# Patient Record
Sex: MALE | Race: WHITE | Employment: FULL TIME | URBAN - METROPOLITAN AREA
[De-identification: names, ages, dates, MRNs, and addresses within clinical notes are randomized per-mention and may not be internally consistent; named-entity substitution may affect disease eponyms.]

---

## 2017-07-06 ENCOUNTER — HOSPITAL ENCOUNTER (OUTPATIENT)
Dept: ULTRASOUND IMAGING | Age: 32
Discharge: HOME | End: 2017-07-06
Attending: INTERNAL MEDICINE
Payer: COMMERCIAL

## 2017-07-06 DIAGNOSIS — R10.31 ABDOMINAL PAIN, RIGHT LOWER QUADRANT: ICD-10-CM

## 2017-07-06 PROCEDURE — 76870 US EXAM SCROTUM: CPT | Performed by: INTERNAL MEDICINE

## 2017-07-06 PROCEDURE — 93975 VASCULAR STUDY: CPT | Performed by: INTERNAL MEDICINE

## 2019-01-09 PROBLEM — R74.8 ELEVATED LIVER ENZYMES: Status: ACTIVE | Noted: 2019-01-09

## 2019-02-22 PROBLEM — E78.00 HYPERCHOLESTEROLEMIA: Status: ACTIVE | Noted: 2019-02-22

## 2019-11-19 PROBLEM — K76.0 FATTY LIVER: Status: ACTIVE | Noted: 2019-11-19

## 2020-08-27 ENCOUNTER — ORDER TRANSCRIPTION (OUTPATIENT)
Dept: ADMINISTRATIVE | Facility: HOSPITAL | Age: 35
End: 2020-08-27

## 2020-08-27 DIAGNOSIS — Z20.822 ENCOUNTER FOR LABORATORY TESTING FOR COVID-19 VIRUS: Primary | ICD-10-CM

## 2020-08-27 DIAGNOSIS — Z01.818 OTHER SPECIFIED PRE-OPERATIVE EXAMINATION: ICD-10-CM

## 2020-08-30 ENCOUNTER — APPOINTMENT (OUTPATIENT)
Dept: LAB | Facility: HOSPITAL | Age: 35
End: 2020-08-30
Attending: INTERNAL MEDICINE
Payer: COMMERCIAL

## 2020-08-30 DIAGNOSIS — Z20.822 ENCOUNTER FOR LABORATORY TESTING FOR COVID-19 VIRUS: ICD-10-CM

## 2020-08-30 DIAGNOSIS — Z01.818 OTHER SPECIFIED PRE-OPERATIVE EXAMINATION: ICD-10-CM

## 2020-08-31 LAB — SARS-COV-2 RNA RESP QL NAA+PROBE: NOT DETECTED

## 2020-09-02 ENCOUNTER — HOSPITAL ENCOUNTER (OUTPATIENT)
Dept: NUCLEAR MEDICINE | Facility: HOSPITAL | Age: 35
Discharge: HOME | End: 2020-09-02
Attending: INTERNAL MEDICINE
Payer: COMMERCIAL

## 2020-09-02 ENCOUNTER — HOSPITAL ENCOUNTER (OUTPATIENT)
Dept: CV DIAGNOSTICS | Facility: HOSPITAL | Age: 35
Discharge: HOME | End: 2020-09-02
Attending: INTERNAL MEDICINE
Payer: COMMERCIAL

## 2020-09-02 DIAGNOSIS — R07.89 OTHER CHEST PAIN: ICD-10-CM

## 2020-09-02 PROCEDURE — 93017 CV STRESS TEST TRACING ONLY: CPT | Performed by: INTERNAL MEDICINE

## 2020-09-02 PROCEDURE — 78452 HT MUSCLE IMAGE SPECT MULT: CPT | Performed by: INTERNAL MEDICINE

## 2020-09-02 PROCEDURE — 93018 CV STRESS TEST I&R ONLY: CPT | Performed by: INTERNAL MEDICINE

## 2020-09-02 PROCEDURE — 93016 CV STRESS TEST SUPVJ ONLY: CPT | Performed by: INTERNAL MEDICINE

## 2021-07-28 NOTE — ED INITIAL ASSESSMENT (HPI)
Pt to ED with c/o \"difficulty catching his breath\" after receiving [facility] about one hour ago. No respiratory distress noted. 98% on room air. Pt denies difficulty swallowing. Airway clear. Pt skin parameters WNL.

## 2021-07-28 NOTE — ED QUICK NOTES
Patient safe to DC home per MD. Morocho Windsor to dress self. DC teaching done, instructions reviewed with patient including when and how to follow up with healthcare providers and when to seek emergency care. The patient verbalizes understanding.  Patient ambulatory

## 2021-07-28 NOTE — ED PROVIDER NOTES
Patient Seen in: [facility] Emergency Department    History   Patient presents with: Allergic Rxn Allergies      HPI    The patient presents to the ED complaining of difficulty breathing and feeling that he cannot take a deep breath.   He states t Handwashing was performed prior to and after the exam.  Stethoscope and any equipment used during my examination was cleaned with super sani-cloth germicidal wipes following the exam.     Physical Exam  Constitutional:       Appearance: Normal appearance. return precautions were discussed with the patient and/or caregiver.       Condition upon leaving the department: Stable    Disposition and Plan     Clinical Impression:  Anxiety attack  (primary encounter diagnosis)    Disposition:  Discharge    Follow-up:

## 2021-08-05 NOTE — H&P
[facility], [facility] - Gastroenterology                                                                                                  Clinic History and Physical 119/71, pulse 95, height 6' 4\" (1.93 m), weight 268 lb (121.6 kg).     General:awake, cooperative, no acute distress  HEENT: EOMI, no scleral icterus, MMM; oral pharnyx is without exudates or lesions  Neck: no lymphadenopathy; thyroid is not enlarged and w the defined types were placed in this encounter.     Meds This Visit:  Requested Prescriptions      No prescriptions requested or ordered in this encounter     Imaging & Referrals:  None     Nargis Paulino MD  [facility], [facility] - Gastroenterology  8/6/202

## 2021-08-06 ENCOUNTER — OFFICE VISIT (OUTPATIENT)
Dept: GASTROENTEROLOGY | Facility: CLINIC | Age: 36
End: 2021-08-06
Payer: COMMERCIAL

## 2021-08-06 ENCOUNTER — TELEPHONE (OUTPATIENT)
Dept: GASTROENTEROLOGY | Facility: CLINIC | Age: 36
End: 2021-08-06

## 2021-08-06 VITALS
DIASTOLIC BLOOD PRESSURE: 71 MMHG | HEART RATE: 95 BPM | HEIGHT: 76 IN | BODY MASS INDEX: 32.63 KG/M2 | WEIGHT: 268 LBS | SYSTOLIC BLOOD PRESSURE: 119 MMHG

## 2021-08-06 DIAGNOSIS — K21.9 GASTROESOPHAGEAL REFLUX DISEASE, UNSPECIFIED WHETHER ESOPHAGITIS PRESENT: Primary | ICD-10-CM

## 2021-08-06 DIAGNOSIS — R13.10 DYSPHAGIA, UNSPECIFIED TYPE: ICD-10-CM

## 2021-08-06 DIAGNOSIS — K21.9 GASTROESOPHAGEAL REFLUX DISEASE WITHOUT ESOPHAGITIS: Primary | ICD-10-CM

## 2021-08-06 DIAGNOSIS — R13.19 ESOPHAGEAL DYSPHAGIA: ICD-10-CM

## 2021-08-06 PROCEDURE — 3074F SYST BP LT 130 MM HG: CPT | Performed by: INTERNAL MEDICINE

## 2021-08-06 PROCEDURE — 3078F DIAST BP <80 MM HG: CPT | Performed by: INTERNAL MEDICINE

## 2021-08-06 PROCEDURE — 99204 OFFICE O/P NEW MOD 45 MIN: CPT | Performed by: INTERNAL MEDICINE

## 2021-08-06 PROCEDURE — 3008F BODY MASS INDEX DOCD: CPT | Performed by: INTERNAL MEDICINE

## 2021-08-06 NOTE — TELEPHONE ENCOUNTER
Scheduled for: EGD 57353 with possible Dilation  Provider Name: Dr Dang Acuna   Date:  Wed 9/08/2021  Location: [facility]  Sedation: MAC   Time: 7:30 am, (pt is aware that [facility] will call the day before to confirm arrival time)  Prep: Nothing after midnight the da

## 2021-08-06 NOTE — PATIENT INSTRUCTIONS
1.Schedule upper endoscopy (EGD) with possible dilatation with monitored anesthesia care (MAC) at the [facility] or the hospital    2. You will also need to get tested for COVID within 72 hours prior to the procedure.  You should be contacted regarding the instru

## 2021-09-03 NOTE — TELEPHONE ENCOUNTER
Peer to peer needed for the MAC per pt's plan. They approved the EGD but not the MAC.  Please call [phone] and the Order #: [id number]

## 2021-09-03 NOTE — TELEPHONE ENCOUNTER
Called and spoke with representative transferring me to reviewer    Spoke with physician reviewer     Question is for need of MAC of which we discussed risk factors and possible need for therapeutic maneuver being dilatation    Approval number   [id number]

## 2021-09-03 NOTE — TELEPHONE ENCOUNTER
Dr. Antonio Yusuf,    I contacted [facility], spoke to [facility], and confirmed that peer to peer is needed for approval of MAC for patient EGD on 9/8/21. The number to call is [phone], option #1 then option #6.  They are available between 9AM-7PM to complete peer

## 2021-09-07 ENCOUNTER — TELEPHONE (OUTPATIENT)
Dept: GASTROENTEROLOGY | Facility: CLINIC | Age: 36
End: 2021-09-07

## 2021-09-07 NOTE — TELEPHONE ENCOUNTER
Pt called to cancel 9/8/21 EGD due to lack of transportation. Pt will call back to schedule at a later date.

## 2021-09-09 ENCOUNTER — APPOINTMENT (OUTPATIENT)
Dept: URBAN - METROPOLITAN AREA CLINIC 321 | Age: 36
Setting detail: DERMATOLOGY
End: 2021-09-09

## 2021-09-09 DIAGNOSIS — L42 PITYRIASIS ROSEA: ICD-10-CM

## 2021-09-09 PROBLEM — L30.9 DERMATITIS, UNSPECIFIED: Status: ACTIVE | Noted: 2021-09-09

## 2021-09-09 PROCEDURE — 99204 OFFICE O/P NEW MOD 45 MIN: CPT

## 2021-09-09 PROCEDURE — OTHER PRESCRIPTION: OTHER

## 2021-09-09 PROCEDURE — OTHER COUNSELING: OTHER

## 2021-09-09 RX ORDER — TRIAMCINOLONE ACETONIDE 1 MG/G
CREAM TOPICAL BID
Qty: 80 | Refills: 0 | Status: ERX

## 2021-09-09 ASSESSMENT — LOCATION SIMPLE DESCRIPTION DERM
LOCATION SIMPLE: LEFT FOREARM
LOCATION SIMPLE: RIGHT FOREARM

## 2021-09-09 ASSESSMENT — LOCATION DETAILED DESCRIPTION DERM
LOCATION DETAILED: RIGHT VENTRAL PROXIMAL FOREARM
LOCATION DETAILED: LEFT VENTRAL PROXIMAL FOREARM

## 2021-09-09 ASSESSMENT — LOCATION ZONE DERM: LOCATION ZONE: ARM

## 2021-09-09 NOTE — TELEPHONE ENCOUNTER
Cancelled for: EGD [id number] with possible Dilation  Provider Name: Dr Vanegas Situ   Date: 9/08/2021  Location: [facility]  Sedation: MAC   Time: 0730    Prep: Nothing after midnight the day before procedure and NPO 3 hrs prior procedure  Meds/Allergies Reconciled?: N

## 2022-01-27 ENCOUNTER — HOSPITAL ENCOUNTER (EMERGENCY)
Facility: HOSPITAL | Age: 37
Discharge: HOME | End: 2022-01-27
Attending: EMERGENCY MEDICINE
Payer: COMMERCIAL

## 2022-01-27 ENCOUNTER — APPOINTMENT (OUTPATIENT)
Dept: GENERAL RADIOLOGY | Facility: HOSPITAL | Age: 37
End: 2022-01-27
Attending: EMERGENCY MEDICINE
Payer: COMMERCIAL

## 2022-01-27 VITALS
WEIGHT: 268 LBS | RESPIRATION RATE: 25 BRPM | SYSTOLIC BLOOD PRESSURE: 120 MMHG | TEMPERATURE: 98 F | BODY MASS INDEX: 32.63 KG/M2 | HEART RATE: 74 BPM | DIASTOLIC BLOOD PRESSURE: 77 MMHG | OXYGEN SATURATION: 99 % | HEIGHT: 76 IN

## 2022-01-27 DIAGNOSIS — R07.9 CHEST PAIN OF UNCERTAIN ETIOLOGY: Primary | ICD-10-CM

## 2022-01-27 LAB
ANION GAP SERPL CALC-SCNC: 8 MMOL/L (ref 0–18)
BASOPHILS # BLD AUTO: 0.06 X10(3) UL (ref 0–0.2)
BASOPHILS NFR BLD AUTO: 0.5 %
BUN BLD-MCNC: 13 MG/DL (ref 7–18)
BUN/CREAT SERPL: 10.9 (ref 10–20)
CALCIUM BLD-MCNC: 9.6 MG/DL (ref 8.5–10.1)
CHLORIDE SERPL-SCNC: 103 MMOL/L (ref 98–112)
CO2 SERPL-SCNC: 26 MMOL/L (ref 21–32)
CREAT BLD-MCNC: 1.19 MG/DL
DEPRECATED RDW RBC AUTO: 42.2 FL (ref 35.1–46.3)
EOSINOPHIL # BLD AUTO: 0.1 X10(3) UL (ref 0–0.7)
EOSINOPHIL NFR BLD AUTO: 0.8 %
ERYTHROCYTE [DISTWIDTH] IN BLOOD BY AUTOMATED COUNT: 13 % (ref 11–15)
GLUCOSE BLD-MCNC: 118 MG/DL (ref 70–99)
HCT VFR BLD AUTO: 49.9 %
HGB BLD-MCNC: 16.4 G/DL
IMM GRANULOCYTES # BLD AUTO: 0.03 X10(3) UL (ref 0–1)
IMM GRANULOCYTES NFR BLD: 0.2 %
LYMPHOCYTES # BLD AUTO: 7.83 X10(3) UL (ref 1–4)
LYMPHOCYTES NFR BLD AUTO: 61.4 %
MCH RBC QN AUTO: 29 PG (ref 26–34)
MCHC RBC AUTO-ENTMCNC: 32.9 G/DL (ref 31–37)
MCV RBC AUTO: 88.2 FL
MONOCYTES # BLD AUTO: 0.92 X10(3) UL (ref 0.1–1)
MONOCYTES NFR BLD AUTO: 7.2 %
NEUTROPHILS # BLD AUTO: 3.81 X10 (3) UL (ref 1.5–7.7)
NEUTROPHILS # BLD AUTO: 3.81 X10(3) UL (ref 1.5–7.7)
NEUTROPHILS NFR BLD AUTO: 29.9 %
OSMOLALITY SERPL CALC.SUM OF ELEC: 285 MOSM/KG (ref 275–295)
PLATELET # BLD AUTO: 362 10(3)UL (ref 150–450)
POTASSIUM SERPL-SCNC: 3.3 MMOL/L (ref 3.5–5.1)
RBC # BLD AUTO: 5.66 X10(6)UL
SODIUM SERPL-SCNC: 137 MMOL/L (ref 136–145)
TROPONIN I HIGH SENSITIVITY: 4 NG/L
WBC # BLD AUTO: 12.8 X10(3) UL (ref 4–11)

## 2022-01-27 PROCEDURE — 93010 ELECTROCARDIOGRAM REPORT: CPT | Performed by: EMERGENCY MEDICINE

## 2022-01-27 PROCEDURE — 71045 X-RAY EXAM CHEST 1 VIEW: CPT | Performed by: EMERGENCY MEDICINE

## 2022-01-27 PROCEDURE — 85025 COMPLETE CBC W/AUTO DIFF WBC: CPT | Performed by: EMERGENCY MEDICINE

## 2022-01-27 PROCEDURE — 93005 ELECTROCARDIOGRAM TRACING: CPT

## 2022-01-27 PROCEDURE — 99284 EMERGENCY DEPT VISIT MOD MDM: CPT

## 2022-01-27 PROCEDURE — 84484 ASSAY OF TROPONIN QUANT: CPT | Performed by: EMERGENCY MEDICINE

## 2022-01-27 PROCEDURE — 36415 COLL VENOUS BLD VENIPUNCTURE: CPT

## 2022-01-27 PROCEDURE — 80048 BASIC METABOLIC PNL TOTAL CA: CPT | Performed by: EMERGENCY MEDICINE

## 2022-01-27 PROCEDURE — 85060 BLOOD SMEAR INTERPRETATION: CPT | Performed by: EMERGENCY MEDICINE

## 2022-01-27 NOTE — ED INITIAL ASSESSMENT (HPI)
Pt to ED from home for c/o chest pressure onset while walking 40 mins ago, states it was a 10/10 now is a 3/10. Denies/vomiting. Pt took 324mg ASA PTA. Hx of GERD, takes prilosec at home.
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
## 2022-01-27 NOTE — ED QUICK NOTES
Pt to ED via EMS with c/o left sided chest pressure that started about 40 min prior to arrival. Pt denies cough or fever. Pt states he felt short of breath when pressure started but now subsided. No respiratory distress noted. Lungs clear bilaterally.  100%

## 2022-02-12 ENCOUNTER — HOSPITAL ENCOUNTER (EMERGENCY)
Facility: HOSPITAL | Age: 37
Discharge: HOME | End: 2022-02-12
Attending: EMERGENCY MEDICINE
Payer: COMMERCIAL

## 2022-02-12 ENCOUNTER — APPOINTMENT (OUTPATIENT)
Dept: CT IMAGING | Facility: HOSPITAL | Age: 37
End: 2022-02-12
Attending: EMERGENCY MEDICINE
Payer: COMMERCIAL

## 2022-02-12 VITALS
RESPIRATION RATE: 17 BRPM | HEIGHT: 76 IN | OXYGEN SATURATION: 97 % | BODY MASS INDEX: 32.27 KG/M2 | TEMPERATURE: 99 F | HEART RATE: 99 BPM | DIASTOLIC BLOOD PRESSURE: 74 MMHG | WEIGHT: 265 LBS | SYSTOLIC BLOOD PRESSURE: 130 MMHG

## 2022-02-12 DIAGNOSIS — U07.1 COVID-19 VIRUS INFECTION: ICD-10-CM

## 2022-02-12 DIAGNOSIS — G44.201 ACUTE INTRACTABLE TENSION-TYPE HEADACHE: Primary | ICD-10-CM

## 2022-02-12 PROCEDURE — 99284 EMERGENCY DEPT VISIT MOD MDM: CPT

## 2022-02-12 PROCEDURE — 70450 CT HEAD/BRAIN W/O DYE: CPT | Performed by: EMERGENCY MEDICINE
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
## 2022-03-25 ENCOUNTER — HOSPITAL ENCOUNTER (EMERGENCY)
Facility: HOSPITAL | Age: 37
Discharge: LEFT BEFORE DISCHARGE | End: 2022-03-25
Payer: COMMERCIAL

## 2022-03-25 VITALS
HEIGHT: 76 IN | SYSTOLIC BLOOD PRESSURE: 153 MMHG | WEIGHT: 260 LBS | RESPIRATION RATE: 18 BRPM | TEMPERATURE: 98 F | DIASTOLIC BLOOD PRESSURE: 102 MMHG | HEART RATE: 60 BPM | BODY MASS INDEX: 31.66 KG/M2 | OXYGEN SATURATION: 100 %

## 2022-03-26 NOTE — ED INITIAL ASSESSMENT (HPI)
Patient presents to ED with c/o of gummy bears in his throat approx 5 hours ago. States he cannot get the gummy bears to go down and has been trying to drink carbonated beverage (which he states it usually works). Able to keep fluids down.

## 2022-04-07 ENCOUNTER — HOSPITAL ENCOUNTER (OUTPATIENT)
Age: 37
Discharge: HOME | End: 2022-04-07
Payer: COMMERCIAL

## 2022-04-07 ENCOUNTER — APPOINTMENT (OUTPATIENT)
Dept: CT IMAGING | Age: 37
End: 2022-04-07
Attending: NURSE PRACTITIONER
Payer: COMMERCIAL

## 2022-04-07 VITALS
DIASTOLIC BLOOD PRESSURE: 79 MMHG | RESPIRATION RATE: 16 BRPM | SYSTOLIC BLOOD PRESSURE: 129 MMHG | TEMPERATURE: 98 F | OXYGEN SATURATION: 99 % | HEART RATE: 83 BPM

## 2022-04-07 DIAGNOSIS — R30.0 DYSURIA: ICD-10-CM

## 2022-04-07 DIAGNOSIS — R10.9 ABDOMINAL PAIN, UNSPECIFIED ABDOMINAL LOCATION: Primary | ICD-10-CM

## 2022-04-07 LAB
#MXD IC: 0.7 X10ˆ3/UL (ref 0.1–1)
BILIRUB UR QL STRIP: NEGATIVE
BUN BLD-MCNC: 13 MG/DL (ref 7–18)
CHLORIDE BLD-SCNC: 103 MMOL/L (ref 98–112)
CLARITY UR: CLEAR
CO2 BLD-SCNC: 29 MMOL/L (ref 21–32)
COLOR UR: YELLOW
CREAT BLD-MCNC: 1 MG/DL
GLUCOSE BLD-MCNC: 93 MG/DL (ref 70–99)
GLUCOSE UR STRIP-MCNC: NEGATIVE MG/DL
HCT VFR BLD AUTO: 48.9 %
HCT VFR BLD CALC: 51 %
HGB BLD-MCNC: 16.2 G/DL
HGB UR QL STRIP: NEGATIVE
ISTAT IONIZED CALCIUM FOR CHEM 8: 1.13 MMOL/L (ref 1.12–1.32)
KETONES UR STRIP-MCNC: NEGATIVE MG/DL
LEUKOCYTE ESTERASE UR QL STRIP: NEGATIVE
LYMPHOCYTES # BLD AUTO: 5.3 X10ˆ3/UL (ref 1–4)
LYMPHOCYTES NFR BLD AUTO: 54.7 %
MCH RBC QN AUTO: 29.2 PG (ref 26–34)
MCHC RBC AUTO-ENTMCNC: 33.1 G/DL (ref 31–37)
MCV RBC AUTO: 88.3 FL (ref 80–100)
MIXED CELL %: 7.5 %
NEUTROPHILS # BLD AUTO: 3.6 X10ˆ3/UL (ref 1.5–7.7)
NEUTROPHILS NFR BLD AUTO: 37.8 %
NITRITE UR QL STRIP: NEGATIVE
PH UR STRIP: 8.5 [PH]
PLATELET # BLD AUTO: 288 X10ˆ3/UL (ref 150–450)
POTASSIUM BLD-SCNC: 4.1 MMOL/L (ref 3.6–5.1)
PROT UR STRIP-MCNC: NEGATIVE MG/DL
RBC # BLD AUTO: 5.54 X10ˆ6/UL
SODIUM BLD-SCNC: 141 MMOL/L (ref 136–145)
SP GR UR STRIP: 1.01
UROBILINOGEN UR STRIP-ACNC: <2 MG/DL
WBC # BLD AUTO: 9.6 X10ˆ3/UL (ref 4–11)

## 2022-04-07 PROCEDURE — 99214 OFFICE O/P EST MOD 30 MIN: CPT

## 2022-04-07 PROCEDURE — 99213 OFFICE O/P EST LOW 20 MIN: CPT

## 2022-04-07 PROCEDURE — 80047 BASIC METABLC PNL IONIZED CA: CPT

## 2022-04-07 PROCEDURE — 74177 CT ABD & PELVIS W/CONTRAST: CPT | Performed by: NURSE PRACTITIONER

## 2022-04-07 PROCEDURE — 85025 COMPLETE CBC W/AUTO DIFF WBC: CPT | Performed by: NURSE PRACTITIONER

## 2022-04-07 PROCEDURE — 81002 URINALYSIS NONAUTO W/O SCOPE: CPT

## 2022-04-07 PROCEDURE — 36415 COLL VENOUS BLD VENIPUNCTURE: CPT
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
## 2022-06-30 ENCOUNTER — OFFICE VISIT (OUTPATIENT)
Dept: FAMILY MEDICINE CLINIC | Facility: CLINIC | Age: 37
End: 2022-06-30
Payer: COMMERCIAL

## 2022-06-30 VITALS
DIASTOLIC BLOOD PRESSURE: 81 MMHG | HEIGHT: 75.6 IN | SYSTOLIC BLOOD PRESSURE: 120 MMHG | WEIGHT: 257 LBS | TEMPERATURE: 98 F | BODY MASS INDEX: 31.62 KG/M2 | HEART RATE: 56 BPM | RESPIRATION RATE: 16 BRPM

## 2022-06-30 DIAGNOSIS — Z00.00 ROUTINE PHYSICAL EXAMINATION: Primary | ICD-10-CM

## 2022-06-30 DIAGNOSIS — K21.9 GASTROESOPHAGEAL REFLUX DISEASE, UNSPECIFIED WHETHER ESOPHAGITIS PRESENT: ICD-10-CM

## 2022-06-30 PROCEDURE — 3079F DIAST BP 80-89 MM HG: CPT | Performed by: FAMILY MEDICINE

## 2022-06-30 PROCEDURE — 3074F SYST BP LT 130 MM HG: CPT | Performed by: FAMILY MEDICINE

## 2022-06-30 PROCEDURE — 3008F BODY MASS INDEX DOCD: CPT | Performed by: FAMILY MEDICINE

## 2022-06-30 PROCEDURE — 99385 PREV VISIT NEW AGE 18-39: CPT | Performed by: FAMILY MEDICINE

## 2022-06-30 RX ORDER — LANSOPRAZOLE 15 MG/1
15 CAPSULE, DELAYED RELEASE ORAL DAILY
COMMUNITY

## 2022-07-26 ENCOUNTER — HOSPITAL ENCOUNTER (EMERGENCY)
Facility: HOSPITAL | Age: 37
Discharge: LEFT BEFORE DISCHARGE | End: 2022-07-26

## 2022-07-26 VITALS
WEIGHT: 250 LBS | HEIGHT: 76 IN | OXYGEN SATURATION: 97 % | BODY MASS INDEX: 30.44 KG/M2 | TEMPERATURE: 98 F | SYSTOLIC BLOOD PRESSURE: 152 MMHG | HEART RATE: 68 BPM | RESPIRATION RATE: 16 BRPM | DIASTOLIC BLOOD PRESSURE: 76 MMHG

## 2022-07-26 LAB — GLUCOSE BLDC GLUCOMTR-MCNC: 119 MG/DL (ref 70–99)

## 2022-07-26 PROCEDURE — 93010 ELECTROCARDIOGRAM REPORT: CPT

## 2022-07-26 PROCEDURE — 93005 ELECTROCARDIOGRAM TRACING: CPT

## 2022-07-26 PROCEDURE — 82962 GLUCOSE BLOOD TEST: CPT

## 2022-07-26 RX ORDER — OMEPRAZOLE 20 MG/1
40 CAPSULE, DELAYED RELEASE ORAL
COMMUNITY

## 2022-07-26 NOTE — ED INITIAL ASSESSMENT (HPI)
Patient ambulatory to ED with c/o dizziness, lightheadedness with a near syncopal event today. Denies n/v/d/fevers.

## 2022-10-14 ENCOUNTER — HOSPITAL ENCOUNTER (EMERGENCY)
Facility: HOSPITAL | Age: 37
Discharge: STILL ADMITTED OR ADMINISTRATIVE | End: 2022-10-14
Payer: COMMERCIAL

## 2022-10-18 ENCOUNTER — HOSPITAL ENCOUNTER (OUTPATIENT)
Age: 37
Discharge: STILL ADMITTED OR ADMINISTRATIVE | End: 2022-10-18
Payer: COMMERCIAL

## 2022-10-27 ENCOUNTER — HOSPITAL ENCOUNTER (EMERGENCY)
Facility: HOSPITAL | Age: 37
Discharge: HOME | End: 2022-10-27
Attending: EMERGENCY MEDICINE
Payer: COMMERCIAL

## 2022-10-27 VITALS
WEIGHT: 250 LBS | OXYGEN SATURATION: 99 % | SYSTOLIC BLOOD PRESSURE: 128 MMHG | RESPIRATION RATE: 15 BRPM | TEMPERATURE: 98 F | BODY MASS INDEX: 30.44 KG/M2 | DIASTOLIC BLOOD PRESSURE: 90 MMHG | HEART RATE: 82 BPM | HEIGHT: 76 IN

## 2022-10-27 DIAGNOSIS — J06.9 VIRAL UPPER RESPIRATORY TRACT INFECTION: Primary | ICD-10-CM

## 2022-10-27 LAB
S PYO AG THROAT QL: NEGATIVE
SARS-COV-2 RNA RESP QL NAA+PROBE: NOT DETECTED

## 2022-10-27 PROCEDURE — 87880 STREP A ASSAY W/OPTIC: CPT

## 2022-10-27 PROCEDURE — 99283 EMERGENCY DEPT VISIT LOW MDM: CPT

## 2022-10-27 RX ORDER — ALBUTEROL SULFATE 90 UG/1
2 AEROSOL, METERED RESPIRATORY (INHALATION) EVERY 4 HOURS PRN
Qty: 1 EACH | Refills: 0 | Status: SHIPPED | OUTPATIENT
Start: 2022-10-27 | End: 2022-11-26

## 2022-10-27 RX ORDER — PSEUDOEPHEDRINE HCL 30 MG
30 TABLET ORAL EVERY 4 HOURS PRN
Qty: 24 TABLET | Refills: 0 | Status: SHIPPED | OUTPATIENT
Start: 2022-10-27

## 2022-10-27 NOTE — ED INITIAL ASSESSMENT (HPI)
Pt to ED /w c/o congestion /w productive cough. Pt is axox4. Sick contact daughter has strep throat.

## 2023-01-05 ENCOUNTER — APPOINTMENT (OUTPATIENT)
Dept: URBAN - METROPOLITAN AREA CLINIC 244 | Age: 38
Setting detail: DERMATOLOGY
End: 2023-01-06

## 2023-01-05 DIAGNOSIS — R21 RASH AND OTHER NONSPECIFIC SKIN ERUPTION: ICD-10-CM

## 2023-01-05 DIAGNOSIS — L30.9 DERMATITIS, UNSPECIFIED: ICD-10-CM

## 2023-01-05 PROCEDURE — OTHER BIOPSY BY PUNCH METHOD: OTHER

## 2023-01-05 PROCEDURE — 11104 PUNCH BX SKIN SINGLE LESION: CPT

## 2023-01-05 PROCEDURE — OTHER PRESCRIPTION MEDICATION MANAGEMENT: OTHER

## 2023-01-05 PROCEDURE — 99214 OFFICE O/P EST MOD 30 MIN: CPT | Mod: 25

## 2023-01-05 PROCEDURE — OTHER COUNSELING: OTHER

## 2023-01-05 ASSESSMENT — LOCATION SIMPLE DESCRIPTION DERM
LOCATION SIMPLE: LEFT FOREARM
LOCATION SIMPLE: RIGHT FOREARM

## 2023-01-05 ASSESSMENT — LOCATION ZONE DERM: LOCATION ZONE: ARM

## 2023-01-05 ASSESSMENT — LOCATION DETAILED DESCRIPTION DERM
LOCATION DETAILED: LEFT VENTRAL PROXIMAL FOREARM
LOCATION DETAILED: RIGHT VENTRAL DISTAL FOREARM

## 2023-01-05 NOTE — PROCEDURE: PRESCRIPTION MEDICATION MANAGEMENT
Plan: Recommend avoiding hot showers,moisturizer frequently. DIscussed proper triamcin use prn
Detail Level: Zone
Render In Strict Bullet Format?: No

## 2023-01-05 NOTE — PROCEDURE: BIOPSY BY PUNCH METHOD
Validate Anticipated Plan: No
Validate Note Data (See Information Below): Yes
Wound Care: Petrolatum
Biopsy Type: H and E
Size Of Lesion In Cm (Optional): 0
Consent: Written consent was obtained and risks were reviewed including but not limited to scarring, infection, bleeding, scabbing, incomplete removal, nerve damage and allergy to anesthesia.
Anesthesia Type: 0.5% lidocaine with 1:200,000 epinephrine and a 1:10 solution of 8.4% sodium bicarbonate
Hemostasis: None
Dressing: bandage
Information: Selecting Yes will display possible errors in your note based on the variables you have selected. This validation is only offered as a suggestion for you. PLEASE NOTE THAT THE VALIDATION TEXT WILL BE REMOVED WHEN YOU FINALIZE YOUR NOTE. IF YOU WANT TO FAX A PRELIMINARY NOTE YOU WILL NEED TO TOGGLE THIS TO 'NO' IF YOU DO NOT WANT IT IN YOUR FAXED NOTE.
Detail Level: Detailed
Epidermal Sutures: 4-0 Nylon
Suture Removal: 14 days
Post-Care Instructions: I reviewed with the patient in detail post-care instructions. Patient is to keep the biopsy site dry overnight, and then apply petrolatum twice daily until healed. Patient may apply hydrogen peroxide soaks to remove any crusting.
Billing Type: Third-Party Bill
Home Suture Removal Text: Patient will remove their sutures at home.  If they have any questions or difficulties they will call the office.
Anesthesia Volume In Cc (Will Not Render If 0): 0.5
Path Notes (To The Dermatopathologist): Non-specific rash - ddx:
Notification Instructions: Patient will be notified of biopsy results. However, patient instructed to call the office if not contacted within 2 weeks.
Punch Size In Mm: 4

## 2023-01-19 ENCOUNTER — APPOINTMENT (OUTPATIENT)
Dept: URBAN - METROPOLITAN AREA CLINIC 244 | Age: 38
Setting detail: DERMATOLOGY
End: 2023-01-19

## 2023-01-19 DIAGNOSIS — L20.89 OTHER ATOPIC DERMATITIS: ICD-10-CM

## 2023-01-19 PROCEDURE — OTHER DEFER: OTHER

## 2023-01-19 PROCEDURE — OTHER PRESCRIPTION: OTHER

## 2023-01-19 PROCEDURE — 99214 OFFICE O/P EST MOD 30 MIN: CPT

## 2023-01-19 PROCEDURE — OTHER COUNSELING: OTHER

## 2023-01-19 RX ORDER — HYDROCORTISONE 25 MG/G
CREAM TOPICAL
Qty: 453.6 | Refills: 2 | Status: ERX | COMMUNITY
Start: 2023-01-19

## 2023-01-19 ASSESSMENT — LOCATION DETAILED DESCRIPTION DERM
LOCATION DETAILED: RIGHT VENTRAL PROXIMAL FOREARM
LOCATION DETAILED: LEFT VENTRAL DISTAL FOREARM

## 2023-01-19 ASSESSMENT — LOCATION ZONE DERM: LOCATION ZONE: ARM

## 2023-01-19 ASSESSMENT — LOCATION SIMPLE DESCRIPTION DERM
LOCATION SIMPLE: LEFT FOREARM
LOCATION SIMPLE: RIGHT FOREARM

## 2023-01-19 NOTE — PROCEDURE: DEFER
Procedure To Be Performed At Next Visit: Phototherapy
Introduction Text (Please End With A Colon): The following procedure was deferred:
X Size Of Lesion In Cm (Optional): 0
Detail Level: Detailed
Instructions (Optional): Phototherapy Was discussed with patient and patient and forms filled out . Patient will call insurance \\nwill go with topical for now\\n

## 2023-04-20 ENCOUNTER — HOSPITAL ENCOUNTER (EMERGENCY)
Facility: HOSPITAL | Age: 38
Discharge: HOME | End: 2023-04-20
Attending: EMERGENCY MEDICINE
Payer: COMMERCIAL

## 2023-04-20 VITALS
OXYGEN SATURATION: 100 % | SYSTOLIC BLOOD PRESSURE: 122 MMHG | DIASTOLIC BLOOD PRESSURE: 93 MMHG | TEMPERATURE: 98 F | RESPIRATION RATE: 16 BRPM | HEART RATE: 68 BPM

## 2023-04-20 DIAGNOSIS — R42 VERTIGO: Primary | ICD-10-CM

## 2023-04-20 LAB
ANION GAP SERPL CALC-SCNC: 6 MMOL/L (ref 0–18)
ATRIAL RATE: 81 BPM
BASOPHILS # BLD AUTO: 0.06 X10(3) UL (ref 0–0.2)
BASOPHILS NFR BLD AUTO: 0.6 %
BILIRUB UR QL: NEGATIVE
BUN BLD-MCNC: 11 MG/DL (ref 7–18)
BUN/CREAT SERPL: 10.2 (ref 10–20)
CALCIUM BLD-MCNC: 9.5 MG/DL (ref 8.5–10.1)
CHLORIDE SERPL-SCNC: 106 MMOL/L (ref 98–112)
CLARITY UR: CLEAR
CO2 SERPL-SCNC: 29 MMOL/L (ref 21–32)
CREAT BLD-MCNC: 1.08 MG/DL
DEPRECATED RDW RBC AUTO: 40.8 FL (ref 35.1–46.3)
EOSINOPHIL # BLD AUTO: 0.13 X10(3) UL (ref 0–0.7)
EOSINOPHIL NFR BLD AUTO: 1.4 %
ERYTHROCYTE [DISTWIDTH] IN BLOOD BY AUTOMATED COUNT: 12.8 % (ref 11–15)
GFR SERPLBLD BASED ON 1.73 SQ M-ARVRAT: 90 ML/MIN/1.73M2 (ref 60–?)
GLUCOSE BLD-MCNC: 97 MG/DL (ref 70–99)
GLUCOSE UR-MCNC: NORMAL MG/DL
HCT VFR BLD AUTO: 49.1 %
HGB BLD-MCNC: 16.2 G/DL
HGB UR QL STRIP.AUTO: NEGATIVE
HYALINE CASTS #/AREA URNS AUTO: PRESENT /LPF
IMM GRANULOCYTES # BLD AUTO: 0.05 X10(3) UL (ref 0–1)
IMM GRANULOCYTES NFR BLD: 0.5 %
KETONES UR-MCNC: NEGATIVE MG/DL
LEUKOCYTE ESTERASE UR QL STRIP.AUTO: NEGATIVE
LYMPHOCYTES # BLD AUTO: 4.43 X10(3) UL (ref 1–4)
LYMPHOCYTES NFR BLD AUTO: 47.6 %
MCH RBC QN AUTO: 28.7 PG (ref 26–34)
MCHC RBC AUTO-ENTMCNC: 33 G/DL (ref 31–37)
MCV RBC AUTO: 87.1 FL
MONOCYTES # BLD AUTO: 0.74 X10(3) UL (ref 0.1–1)
MONOCYTES NFR BLD AUTO: 8 %
NEUTROPHILS # BLD AUTO: 3.89 X10 (3) UL (ref 1.5–7.7)
NEUTROPHILS # BLD AUTO: 3.89 X10(3) UL (ref 1.5–7.7)
NEUTROPHILS NFR BLD AUTO: 41.9 %
NITRITE UR QL STRIP.AUTO: NEGATIVE
OSMOLALITY SERPL CALC.SUM OF ELEC: 291 MOSM/KG (ref 275–295)
P AXIS: 21 DEGREES
P-R INTERVAL: 148 MS
PH UR: 5.5 [PH] (ref 5–8)
PLATELET # BLD AUTO: 292 10(3)UL (ref 150–450)
POTASSIUM SERPL-SCNC: 4.2 MMOL/L (ref 3.5–5.1)
PROT UR-MCNC: NEGATIVE MG/DL
Q-T INTERVAL: 386 MS
QRS DURATION: 94 MS
QTC CALCULATION (BEZET): 448 MS
R AXIS: -24 DEGREES
RBC # BLD AUTO: 5.64 X10(6)UL
SODIUM SERPL-SCNC: 141 MMOL/L (ref 136–145)
SP GR UR STRIP: 1.02 (ref 1–1.03)
T AXIS: -3 DEGREES
UROBILINOGEN UR STRIP-ACNC: NORMAL
VENTRICULAR RATE: 81 BPM
WBC # BLD AUTO: 9.3 X10(3) UL (ref 4–11)

## 2023-04-20 PROCEDURE — 96360 HYDRATION IV INFUSION INIT: CPT

## 2023-04-20 PROCEDURE — 99284 EMERGENCY DEPT VISIT MOD MDM: CPT

## 2023-04-20 PROCEDURE — 80048 BASIC METABOLIC PNL TOTAL CA: CPT | Performed by: EMERGENCY MEDICINE

## 2023-04-20 PROCEDURE — 85025 COMPLETE CBC W/AUTO DIFF WBC: CPT | Performed by: EMERGENCY MEDICINE

## 2023-04-20 PROCEDURE — 93010 ELECTROCARDIOGRAM REPORT: CPT

## 2023-04-20 PROCEDURE — 96361 HYDRATE IV INFUSION ADD-ON: CPT

## 2023-04-20 PROCEDURE — 93005 ELECTROCARDIOGRAM TRACING: CPT

## 2023-04-20 RX ORDER — MECLIZINE HYDROCHLORIDE 25 MG/1
25 TABLET ORAL ONCE
Status: COMPLETED | OUTPATIENT
Start: 2023-04-20 | End: 2023-04-20

## 2023-04-20 RX ORDER — MECLIZINE HYDROCHLORIDE 25 MG/1
25 TABLET ORAL EVERY 6 HOURS PRN
Qty: 20 TABLET | Refills: 0 | Status: SHIPPED | OUTPATIENT
Start: 2023-04-20 | End: 2023-04-27

## 2023-04-20 NOTE — DISCHARGE INSTRUCTIONS
Do not drive until dizziness has resolved  Meclizine as needed for dizziness  Follow-up with your doctor  Return if worse

## 2023-06-11 ENCOUNTER — HOSPITAL ENCOUNTER (OUTPATIENT)
Age: 38
Discharge: HOME | End: 2023-06-11
Attending: EMERGENCY MEDICINE
Payer: COMMERCIAL

## 2023-06-11 ENCOUNTER — APPOINTMENT (OUTPATIENT)
Dept: GENERAL RADIOLOGY | Age: 38
End: 2023-06-11
Attending: EMERGENCY MEDICINE
Payer: COMMERCIAL

## 2023-06-11 VITALS
TEMPERATURE: 98 F | DIASTOLIC BLOOD PRESSURE: 89 MMHG | RESPIRATION RATE: 18 BRPM | OXYGEN SATURATION: 100 % | HEART RATE: 83 BPM | SYSTOLIC BLOOD PRESSURE: 151 MMHG

## 2023-06-11 DIAGNOSIS — S76.011A HIP STRAIN, RIGHT, INITIAL ENCOUNTER: Primary | ICD-10-CM

## 2023-06-11 LAB
BILIRUB UR QL STRIP: NEGATIVE
CLARITY UR: CLEAR
COLOR UR: YELLOW
GLUCOSE UR STRIP-MCNC: NEGATIVE MG/DL
HGB UR QL STRIP: NEGATIVE
KETONES UR STRIP-MCNC: NEGATIVE MG/DL
LEUKOCYTE ESTERASE UR QL STRIP: NEGATIVE
NITRITE UR QL STRIP: NEGATIVE
PH UR STRIP: 5.5 [PH]
PROT UR STRIP-MCNC: NEGATIVE MG/DL
SP GR UR STRIP: 1.02
UROBILINOGEN UR STRIP-ACNC: <2 MG/DL

## 2023-06-11 PROCEDURE — 99213 OFFICE O/P EST LOW 20 MIN: CPT

## 2023-06-11 PROCEDURE — 81002 URINALYSIS NONAUTO W/O SCOPE: CPT

## 2023-06-11 PROCEDURE — 73502 X-RAY EXAM HIP UNI 2-3 VIEWS: CPT | Performed by: EMERGENCY MEDICINE

## 2023-06-11 NOTE — ED INITIAL ASSESSMENT (HPI)
rlq abd pain on and off for 2 weeks, lump near groin area, no n/v/d, no flank or back pain, no urinary symptoms

## 2023-07-20 ENCOUNTER — HOSPITAL ENCOUNTER (OUTPATIENT)
Age: 38
Discharge: HOME | End: 2023-07-20
Attending: EMERGENCY MEDICINE
Payer: COMMERCIAL

## 2023-07-20 VITALS
HEART RATE: 65 BPM | SYSTOLIC BLOOD PRESSURE: 131 MMHG | RESPIRATION RATE: 20 BRPM | DIASTOLIC BLOOD PRESSURE: 79 MMHG | OXYGEN SATURATION: 100 % | TEMPERATURE: 98 F

## 2023-07-20 DIAGNOSIS — K80.50 BILIARY COLIC: Primary | ICD-10-CM

## 2023-07-20 LAB
BILIRUB UR QL STRIP: NEGATIVE
COLOR UR: YELLOW
GLUCOSE UR STRIP-MCNC: NEGATIVE MG/DL
HGB UR QL STRIP: NEGATIVE
KETONES UR STRIP-MCNC: NEGATIVE MG/DL
LEUKOCYTE ESTERASE UR QL STRIP: NEGATIVE
NITRITE UR QL STRIP: NEGATIVE
PH UR STRIP: 6 [PH]
PROT UR STRIP-MCNC: NEGATIVE MG/DL
SP GR UR STRIP: 1.02
UROBILINOGEN UR STRIP-ACNC: <2 MG/DL

## 2023-07-20 PROCEDURE — 81002 URINALYSIS NONAUTO W/O SCOPE: CPT

## 2023-07-20 PROCEDURE — 99212 OFFICE O/P EST SF 10 MIN: CPT

## 2023-07-20 PROCEDURE — 99213 OFFICE O/P EST LOW 20 MIN: CPT

## 2023-07-20 NOTE — ED INITIAL ASSESSMENT (HPI)
Patient arrived ambulatory to room c/o right sided flank pain that started 4 days ago. Pain intermittent. No n/v/d. No fevers. No urinary complaints. No penile discharge.

## 2023-07-20 NOTE — DISCHARGE INSTRUCTIONS
Go to er for vomiting, fever or intractable pain  Avoid fatty foods  Ibuprofen 600 mg every 6 hours as needed

## 2023-08-14 ENCOUNTER — HOSPITAL ENCOUNTER (OUTPATIENT)
Age: 38
Discharge: HOME | End: 2023-08-14
Payer: COMMERCIAL

## 2023-08-14 ENCOUNTER — APPOINTMENT (OUTPATIENT)
Dept: GENERAL RADIOLOGY | Age: 38
End: 2023-08-14
Attending: NURSE PRACTITIONER
Payer: COMMERCIAL

## 2023-08-14 VITALS
OXYGEN SATURATION: 96 % | DIASTOLIC BLOOD PRESSURE: 96 MMHG | HEART RATE: 72 BPM | TEMPERATURE: 98 F | SYSTOLIC BLOOD PRESSURE: 148 MMHG | RESPIRATION RATE: 18 BRPM

## 2023-08-14 DIAGNOSIS — S39.012A STRAIN OF LUMBAR REGION, INITIAL ENCOUNTER: ICD-10-CM
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
## 2023-08-14 PROCEDURE — 72100 X-RAY EXAM L-S SPINE 2/3 VWS: CPT | Performed by: NURSE PRACTITIONER

## 2023-08-14 PROCEDURE — 99214 OFFICE O/P EST MOD 30 MIN: CPT

## 2023-08-14 PROCEDURE — 99213 OFFICE O/P EST LOW 20 MIN: CPT

## 2023-08-14 RX ORDER — CYCLOBENZAPRINE HCL 10 MG
10 TABLET ORAL 3 TIMES DAILY PRN
Qty: 10 TABLET | Refills: 0 | Status: SHIPPED | OUTPATIENT
Start: 2023-08-14 | End: 2023-08-21

## 2023-08-14 NOTE — DISCHARGE INSTRUCTIONS
The x-ray looks good. Take ibuprofen as needed for pain. Use the muscle relaxer to relax the tightness. Apply heat. Gentle stretching. Consider massage.   Follow-up with your primary doctor for continued symptoms
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
## 2023-11-29 ENCOUNTER — APPOINTMENT (OUTPATIENT)
Dept: URBAN - METROPOLITAN AREA CLINIC 244 | Age: 38
Setting detail: DERMATOLOGY
End: 2023-11-29

## 2023-11-29 DIAGNOSIS — L30.9 DERMATITIS, UNSPECIFIED: ICD-10-CM

## 2023-11-29 PROCEDURE — OTHER COUNSELING: OTHER

## 2023-11-29 PROCEDURE — 99213 OFFICE O/P EST LOW 20 MIN: CPT

## 2023-11-29 PROCEDURE — OTHER PRESCRIPTION: OTHER

## 2023-11-29 RX ORDER — BUTENAFINE HCL 1 %
CREAM (GRAM) TOPICAL
Qty: 30 | Refills: 0 | Status: ERX | COMMUNITY
Start: 2023-11-29

## 2023-11-29 RX ORDER — HYDROCORTISONE 25 MG/G
CREAM TOPICAL BID
Qty: 30 | Refills: 3 | Status: ERX | COMMUNITY
Start: 2023-11-29

## 2023-11-29 ASSESSMENT — LOCATION SIMPLE DESCRIPTION DERM
LOCATION SIMPLE: GENITALIA
LOCATION SIMPLE: LEFT THIGH

## 2023-11-29 ASSESSMENT — LOCATION DETAILED DESCRIPTION DERM
LOCATION DETAILED: LEFT ANTERIOR PROXIMAL THIGH
LOCATION DETAILED: GENITALIA

## 2023-11-29 ASSESSMENT — LOCATION ZONE DERM
LOCATION ZONE: LEG
LOCATION ZONE: GENITALIA

## 2023-11-29 NOTE — HPI: RASH
How Severe Is Your Rash?: mild
Is This A New Presentation, Or A Follow-Up?: Rash
Additional History: Used OTC lotrimin but was not consistent

## 2023-12-01 RX ORDER — BUTENAFINE HCL 1 %
CREAM (GRAM) TOPICAL
Qty: 90 | Refills: 0 | Status: ERX

## 2023-12-15 ENCOUNTER — APPOINTMENT (OUTPATIENT)
Dept: CT IMAGING | Age: 38
End: 2023-12-15
Attending: EMERGENCY MEDICINE
Payer: COMMERCIAL

## 2023-12-15 ENCOUNTER — HOSPITAL ENCOUNTER (OUTPATIENT)
Age: 38
Discharge: HOME | End: 2023-12-15
Attending: EMERGENCY MEDICINE
Payer: COMMERCIAL

## 2023-12-15 VITALS
SYSTOLIC BLOOD PRESSURE: 133 MMHG | RESPIRATION RATE: 18 BRPM | TEMPERATURE: 98 F | DIASTOLIC BLOOD PRESSURE: 85 MMHG | HEART RATE: 80 BPM | OXYGEN SATURATION: 97 %

## 2023-12-15 DIAGNOSIS — K76.0 FATTY LIVER: ICD-10-CM

## 2023-12-15 DIAGNOSIS — R10.9 ABDOMINAL PAIN OF UNKNOWN ETIOLOGY: Primary | ICD-10-CM

## 2023-12-15 DIAGNOSIS — R80.9 PROTEINURIA, UNSPECIFIED TYPE: ICD-10-CM

## 2023-12-15 DIAGNOSIS — K59.00 CONSTIPATION, UNSPECIFIED CONSTIPATION TYPE: ICD-10-CM

## 2023-12-15 LAB
#MXD IC: 1.1 X10ˆ3/UL (ref 0.1–1)
BILIRUB UR QL STRIP: NEGATIVE
BUN BLD-MCNC: 14 MG/DL (ref 7–18)
CHLORIDE BLD-SCNC: 104 MMOL/L (ref 98–112)
CLARITY UR: CLEAR
CO2 BLD-SCNC: 28 MMOL/L (ref 21–32)
COLOR UR: YELLOW
CREAT BLD-MCNC: 1.1 MG/DL
EGFRCR SERPLBLD CKD-EPI 2021: 88 ML/MIN/1.73M2 (ref 60–?)
GLUCOSE BLD-MCNC: 89 MG/DL (ref 70–99)
GLUCOSE UR STRIP-MCNC: NEGATIVE MG/DL
HCT VFR BLD AUTO: 47.4 %
HCT VFR BLD CALC: 51 %
HGB BLD-MCNC: 15.7 G/DL
HGB UR QL STRIP: NEGATIVE
ISTAT IONIZED CALCIUM FOR CHEM 8: 1.23 MMOL/L (ref 1.12–1.32)
KETONES UR STRIP-MCNC: NEGATIVE MG/DL
LEUKOCYTE ESTERASE UR QL STRIP: NEGATIVE
LYMPHOCYTES # BLD AUTO: 5 X10ˆ3/UL (ref 1–4)
LYMPHOCYTES NFR BLD AUTO: 42.7 %
MCH RBC QN AUTO: 28.8 PG (ref 26–34)
MCHC RBC AUTO-ENTMCNC: 33.1 G/DL (ref 31–37)
MCV RBC AUTO: 86.8 FL (ref 80–100)
MIXED CELL %: 9 %
NEUTROPHILS # BLD AUTO: 5.6 X10ˆ3/UL (ref 1.5–7.7)
NEUTROPHILS NFR BLD AUTO: 48.3 %
NITRITE UR QL STRIP: NEGATIVE
PH UR STRIP: 7.5 [PH]
PLATELET # BLD AUTO: 348 X10ˆ3/UL (ref 150–450)
POTASSIUM BLD-SCNC: 4.3 MMOL/L (ref 3.6–5.1)
PROT UR STRIP-MCNC: 30 MG/DL
RBC # BLD AUTO: 5.46 X10ˆ6/UL
SODIUM BLD-SCNC: 142 MMOL/L (ref 136–145)
SP GR UR STRIP: 1.02
UROBILINOGEN UR STRIP-ACNC: <2 MG/DL
WBC # BLD AUTO: 11.7 X10ˆ3/UL (ref 4–11)

## 2023-12-15 PROCEDURE — 74177 CT ABD & PELVIS W/CONTRAST: CPT | Performed by: EMERGENCY MEDICINE

## 2023-12-15 PROCEDURE — 99213 OFFICE O/P EST LOW 20 MIN: CPT

## 2023-12-15 PROCEDURE — 36415 COLL VENOUS BLD VENIPUNCTURE: CPT

## 2023-12-15 PROCEDURE — 85025 COMPLETE CBC W/AUTO DIFF WBC: CPT | Performed by: EMERGENCY MEDICINE

## 2023-12-15 PROCEDURE — 81002 URINALYSIS NONAUTO W/O SCOPE: CPT

## 2023-12-15 PROCEDURE — 80047 BASIC METABLC PNL IONIZED CA: CPT

## 2023-12-15 PROCEDURE — 99214 OFFICE O/P EST MOD 30 MIN: CPT

## 2023-12-15 NOTE — ED INITIAL ASSESSMENT (HPI)
Patient arrives ambulatory with c/o right sided side/hip pain x 1 month. Denies n/v/d. Denies fevers. Denies urinary symptoms.

## 2023-12-19 ENCOUNTER — DOCUMENTATION ONLY (OUTPATIENT)
Dept: SURGERY | Facility: CLINIC | Age: 38
End: 2023-12-19

## 2023-12-19 ENCOUNTER — OFFICE VISIT (OUTPATIENT)
Dept: SURGERY | Facility: CLINIC | Age: 38
End: 2023-12-19
Payer: COMMERCIAL

## 2023-12-19 VITALS
HEIGHT: 75.6 IN | BODY MASS INDEX: 33.12 KG/M2 | DIASTOLIC BLOOD PRESSURE: 88 MMHG | OXYGEN SATURATION: 98 % | HEART RATE: 81 BPM | WEIGHT: 269.13 LBS | SYSTOLIC BLOOD PRESSURE: 132 MMHG

## 2023-12-19 DIAGNOSIS — E88.819 INSULIN RESISTANCE: ICD-10-CM

## 2023-12-19 DIAGNOSIS — E66.9 OBESITY (BMI 30-39.9): ICD-10-CM

## 2023-12-19 DIAGNOSIS — R12 HEART BURN: ICD-10-CM

## 2023-12-19 DIAGNOSIS — K76.0 FATTY LIVER: ICD-10-CM

## 2023-12-19 DIAGNOSIS — E78.5 DYSLIPIDEMIA: Primary | ICD-10-CM

## 2023-12-19 PROCEDURE — 3008F BODY MASS INDEX DOCD: CPT | Performed by: INTERNAL MEDICINE

## 2023-12-19 PROCEDURE — 3079F DIAST BP 80-89 MM HG: CPT | Performed by: INTERNAL MEDICINE

## 2023-12-19 PROCEDURE — 3075F SYST BP GE 130 - 139MM HG: CPT | Performed by: INTERNAL MEDICINE

## 2023-12-19 PROCEDURE — 99205 OFFICE O/P NEW HI 60 MIN: CPT | Performed by: INTERNAL MEDICINE

## 2023-12-21 ENCOUNTER — TELEPHONE (OUTPATIENT)
Dept: SURGERY | Facility: CLINIC | Age: 38
End: 2023-12-21

## 2024-02-21 NOTE — PROGRESS NOTES
FAMILY MEDICINE CLINIC NOTE    HPI  Juan C Jin is a 38 year old male presenting for physical    #Health Maintenance  -Diet: Intermediate - eats fruits. Now on Wegovy and eating less   -Exercise: Basketball weekly   -Lung cancer screen: Not indicated  -Colon cancer screen: Not indicated  -Prostate cancer screen: Not indicated  -Aortic aneurysm screen: Not indicated  -Statin:  Will check lipid panel  -ASA: Not indicated  -HIV screen: Not indicated - Reports negative in past   -Hep C screen: Indicated - future screen  -Gonorrhea/chlamydia:  Not indicated  -Syphillis: Not indicated  -TB: Not indicated  -Tobacco/alcohol: Per below  -Depression: PHQ-2 score of 0 (score >/= 3 do PHQ-9)  -Advanced Directive: Indicated    #Immunizations  -Tdap:  3/2017  -Flu shot: Indicated - declines  -PCV13: Not indicated   -PCV20: Not indicated   -PPSV23: Not indicated   -HPV: Not indicated  -RZV (preferred) or VZL: Not indicated   -RSV: Not indicated   -COVID: Indicated    #GERD  -omperazole 40 mg daily   -pepcid not helpful  -had EGD 2/2022    #HLD  11/28/23  -cholesterol 270,       #Fatty liver  #Elevated liver enzymes  -LFTs elevated with blood work 11/2023  -liver US 2/2019 - IMPRESSION:   Diffuse fatty infiltration of the liver   -iron normal, ferritin mildly elevated 11/2019    #Obesity  #Insulin resistance   -A1c 5.2  -wei   -Dr Tyler    #Hip concern  -R sided  -last 3 weeks, feels something hard  -denies pain  -sometimes when sitting in car  -no trauma or injury   -no issues with movement of legs  -no abd pain  -no N/V/D  -no pain meds  -no persistent numbness/tingling    #Patient Care Team  Patient Care Team:  Bernice Patel DO as PCP - General (Internal Medicine)  Dejuan Stubbs MD (GASTROENTEROLOGY)  Miguel Tyler MD as Consulting Physician (BARIATRICS)    ROS  GENERAL: No fever/chills, no recent weight loss   HEENT: No visual changes, no changes in hearing, no sore throats  NECK: No pain, no swelling  RESP:  No cough, no SOB  CV: No chest pain, no palpitations  GI: No abd pain, no N/V/D  MSK: No edema, no pain  SKIN: No new rashes  NEURO: No numbness, no tingling, no HA    HEALTH MAINTENANCE CHECKLIST  Health Maintenance Topics with due status: Overdue       Topic Date Due    Annual Physical 2023    COVID-19 Vaccine 2023    Influenza Vaccine 10/01/2023    Annual Depression Screening 2024       ALLERGIES  No Known Allergies    MEDICATIONS  Current Outpatient Medications   Medication Sig Dispense Refill    WEGOVY 2.4 MG/0.75ML Subcutaneous Solution Auto-injector ADMINISTER 2.4 MG UNDER THE SKIN 1 TIME A WEEK FOR 4 DOSES      Omeprazole 40 MG Oral Capsule Delayed Release Take 1 capsule (40 mg total) by mouth daily. Before meal 90 capsule 3       ACTIVE PROBLEM  Patient Active Problem List   Diagnosis    Elevated liver enzymes    Hypercholesterolemia    Fatty liver    Class 1 obesity due to excess calories without serious comorbidity with body mass index (BMI) of 32.0 to 32.9 in adult    Insulin resistance    GERD (gastroesophageal reflux disease)    Health maintenance examination    Discomfort of right hip       PAST MEDICAL HISTORY  Past Medical History:   Diagnosis Date    Fatty liver     GERD (gastroesophageal reflux disease) 2024    Hypercholesterolemia 2019    Hyperlipidemia        PAST SOCIAL HISTORY  Social History     Socioeconomic History    Marital status:      Spouse name: Not on file    Number of children: Not on file    Years of education: Not on file    Highest education level: Not on file   Occupational History    Not on file   Tobacco Use    Smoking status: Former     Packs/day: 0.05     Years: 5.00     Additional pack years: 0.00     Total pack years: 0.25     Types: Cigarettes     Quit date: 2023     Years since quittin.0    Smokeless tobacco: Never   Vaping Use    Vaping Use: Never used   Substance and Sexual Activity    Alcohol use: Yes     Comment:  Occasional - once every 3 months    Drug use: Not Currently     Types: Cannabis    Sexual activity: Yes     Partners: Female   Other Topics Concern    Not on file   Social History Narrative    Relationships:  - Marija    Children: Martha (6F) Mack (3M)    Pets: None    School: N/A    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     Origin: [place]. Father - [place]ese. Mother - [place] descent.     Interests: Enjoys playing basketball, spending time with kids     Spiritual: Believes in God. Not practicing any cassi.      Social Determinants of Health     Financial Resource Strain: Not on file   Food Insecurity: Not on file   Transportation Needs: Not on file   Physical Activity: Not on file   Stress: Not on file   Social Connections: Not on file   Housing Stability: Not on file       PAST SURGICAL HISTORY  Past Surgical History:   Procedure Laterality Date    EGD  3/8/2022            PAST FAMILY HISTORY  Family History   Problem Relation Age of Onset    Other (Multiple Sclerosis) Mother     Hyperlipidemia Mother     Colon Polyps Mother     Hyperlipidemia Father     Obesity Sister     Diabetes Brother     Obesity Brother     No Known Problems Maternal Grandmother     No Known Problems Maternal Grandfather     Stroke Paternal Grandmother     Cancer Paternal Grandfather         Liver    Other (Multiple Sclerosis) Cousin     Other (Multiple Sclerosis) Maternal Uncle     Colon Cancer Neg     Prostate Cancer Neg        PHYSICAL EXAM  Vitals:    02/22/24 1321   BP: 114/80   Pulse: 89   Temp: 96.7 °F (35.9 °C)   SpO2: 97%   Weight: 261 lb 3.2 oz (118.5 kg)   Height: 6' 3.6\" (1.92 m)      Body mass index is 32.13 kg/m².    GENERAL: NAD  HEENT: Moist mucous membranes, no tonsillar swelling or erythema, PERRLA bilat, TM translucent and non-bulging  NECK: Supple, non-tender  RESP: CTAB, no wheezing, no rales, no ronchi  CV: RRR, no murmurs  GI: Soft, non-distended, non-tender, no guarding, no rebound, no masses  MSK: No  edema.  No significant tenderness to the anterior portion of the right hip where he reports he has intermittent feelings of discomfort.  No lateral hip tenderness.  FADIR and CASSIE negative bilaterally.  5 out of 5 strength with flexion extension of the bilateral hips  SKIN: Warm and dry, no rashes  NEURO: Answering questions appropriately    LABS  Lab Results   Component Value Date    WBC 9.3 04/20/2023    HGB 16.2 04/20/2023    HCT 49.1 04/20/2023    .0 04/20/2023    NEPERCENT 48.3 12/15/2023    LYPERCENT 42.7 12/15/2023    MOPERCENT 8.0 04/20/2023    EOPERCENT 1.4 04/20/2023    BAPERCENT 0.6 04/20/2023    NE 3.89 04/20/2023    LYMABS 4.43 (H) 04/20/2023    MOABSO 0.74 04/20/2023    EOABSO 0.13 04/20/2023    BAABSO 0.06 04/20/2023       Lab Results   Component Value Date     04/20/2023    K 4.2 04/20/2023     04/20/2023    CO2 29.0 04/20/2023    ANIONGAP 6 04/20/2023    BUN 11 04/20/2023    CREATSERUM 1.08 04/20/2023    BUNCREA 10.2 04/20/2023    GLU 97 04/20/2023    CA 9.5 04/20/2023    OSMOCALC 291 04/20/2023    GFRNAA 96 04/07/2022    GFRAA 111 04/07/2022    ALT 88 (H) 11/19/2019    AST 38 11/19/2019    ALKPHO 95 11/19/2019    BILT 0.46 11/19/2019    TP 7.9 11/19/2019    ALB 4.2 11/19/2019    FASTING Yes 11/19/2019    FASTING Yes 11/19/2019         Lab Results   Component Value Date    CHOLEST 263.00 (H) 11/19/2019    TRIG 272.00 (H) 11/19/2019    HDL 41 (L) 11/19/2019     (H) 11/19/2019        DIAGNOSTICS    ASSESSMENT/PLAN  Problem List Items Addressed This Visit          Cardiac and Vasculature    Hypercholesterolemia     History of hyperlipidemia.  Healthy diet and exercise advised.  Not meeting criteria for statin therapy at this time.          Relevant Medications    WEGOVY 2.4 MG/0.75ML Subcutaneous Solution Auto-injector       Endocrine and Metabolic    Class 1 obesity due to excess calories without serious comorbidity with body mass index (BMI) of 32.0 to 32.9 in adult      History of insulin resistance and obesity.  Following with weight specialist.  On wegovy.          Relevant Medications    WEGOVY 2.4 MG/0.75ML Subcutaneous Solution Auto-injector    Insulin resistance     History of insulin resistance and obesity.  Following with weight specialist.  On wegovy.          Relevant Medications    WEGOVY 2.4 MG/0.75ML Subcutaneous Solution Auto-injector       Gastrointestinal and Abdominal    Elevated liver enzymes     History of fatty liver and elevated liver enzymes.  Suspect nonalcoholic fatty liver disease  With future blood work consider checking viral hepatitis panel as well as iron and ferritin levels again.  Diet and exercise advised  Would benefit from weight loss  Consider repeat ultrasound next year depending on liver enzymes.         Fatty liver     History of fatty liver and elevated liver enzymes.  Suspect nonalcoholic fatty liver disease  With future blood work consider checking viral hepatitis panel as well as iron and ferritin levels again.  Diet and exercise advised  Would benefit from weight loss  Consider repeat ultrasound next year depending on liver enzymes.         GERD (gastroesophageal reflux disease)     History of of severe acid reflux.  Has had EGD completed.  Currently on omeprazole 40 mg daily  Discussed with him that he may benefit from decreasing dosage if possible  Can also try Pepcid if able to tolerate  Weight loss discussed  Avoid food triggers.  Follow-up as needed            Musculoskeletal and Injuries    Discomfort of right hip     Intermittent right hip discomfort.  No significant pain at this time  Has good range of movement and no tenderness palpation.  Advise monitoring  Can take Tylenol as needed for discomfort  Follow-up as needed            Health Encounters    Health maintenance examination - Primary     Exercise and diet advised.  Outside labs from [facility] reviewed.   Flu shot declined  Advanced directive information provided.  Advised  COVID vaccine.            Return in about 1 year (around 2/22/2025) for physical .    Leonard Chinchilla MD  Family Medicine

## 2024-02-22 ENCOUNTER — PATIENT MESSAGE (OUTPATIENT)
Dept: INTERNAL MEDICINE CLINIC | Facility: CLINIC | Age: 39
End: 2024-02-22

## 2024-02-22 ENCOUNTER — OFFICE VISIT (OUTPATIENT)
Dept: INTERNAL MEDICINE CLINIC | Facility: CLINIC | Age: 39
End: 2024-02-22
Payer: COMMERCIAL

## 2024-02-22 VITALS
HEIGHT: 75.6 IN | OXYGEN SATURATION: 97 % | HEART RATE: 89 BPM | BODY MASS INDEX: 32.14 KG/M2 | TEMPERATURE: 97 F | DIASTOLIC BLOOD PRESSURE: 80 MMHG | SYSTOLIC BLOOD PRESSURE: 114 MMHG | WEIGHT: 261.19 LBS

## 2024-02-22 DIAGNOSIS — M25.551 DISCOMFORT OF RIGHT HIP: ICD-10-CM

## 2024-02-22 DIAGNOSIS — K76.0 FATTY LIVER: ICD-10-CM

## 2024-02-22 DIAGNOSIS — E78.00 HYPERCHOLESTEROLEMIA: ICD-10-CM

## 2024-02-22 DIAGNOSIS — Z00.00 HEALTH MAINTENANCE EXAMINATION: Primary | ICD-10-CM

## 2024-02-22 DIAGNOSIS — K21.9 GASTROESOPHAGEAL REFLUX DISEASE, UNSPECIFIED WHETHER ESOPHAGITIS PRESENT: ICD-10-CM

## 2024-02-22 DIAGNOSIS — R74.8 ELEVATED LIVER ENZYMES: ICD-10-CM

## 2024-02-22 DIAGNOSIS — E66.09 CLASS 1 OBESITY DUE TO EXCESS CALORIES WITHOUT SERIOUS COMORBIDITY WITH BODY MASS INDEX (BMI) OF 32.0 TO 32.9 IN ADULT: ICD-10-CM

## 2024-02-22 DIAGNOSIS — E88.819 INSULIN RESISTANCE: ICD-10-CM

## 2024-02-22 PROBLEM — E78.5 DYSLIPIDEMIA: Status: RESOLVED | Noted: 2023-12-19 | Resolved: 2024-02-22

## 2024-02-22 PROBLEM — R12 HEART BURN: Status: RESOLVED | Noted: 2023-12-19 | Resolved: 2024-02-22

## 2024-02-22 PROBLEM — E66.811 CLASS 1 OBESITY DUE TO EXCESS CALORIES WITHOUT SERIOUS COMORBIDITY WITH BODY MASS INDEX (BMI) OF 32.0 TO 32.9 IN ADULT: Status: ACTIVE | Noted: 2023-12-19

## 2024-02-22 LAB
ALKALINE PHOSPHATASE: 106
BILIRUBIN, TOTAL: 0.5 MG/DL
CALCIUM: 9.9
CARBON DIOXIDE: 28
CHLORIDE: 105
CREATININE: 1.04 MG/DL
EGFR: 94
GLUCOSE: 68
HCT: 47.9
HDL CHOLESTEROL: 44 MG/DL (ref 60–60)
HEMOGLOBIN A1C: 5.2 %
HGB: 15.9
LDL CHOLESTEROL: 168 MG/DL (ref ?–130)
NON HDL CHOL. (LDL+VLDL): 226 MG/DL (ref ?–140)
PLT: 329
POTASSIUM: 4.7
RED BLOOD COUNT: 5.51
SODIUM: 142
TOTAL CHOLESTEROL: 270 MG/DL (ref ?–200)
TRG: 344 MG/DL (ref ?–150)
UREA NITROGEN, 24HR: 13 MG/24 HR
WBC: 9.7

## 2024-02-22 PROCEDURE — 3079F DIAST BP 80-89 MM HG: CPT | Performed by: FAMILY MEDICINE

## 2024-02-22 PROCEDURE — 3008F BODY MASS INDEX DOCD: CPT | Performed by: FAMILY MEDICINE

## 2024-02-22 PROCEDURE — 3074F SYST BP LT 130 MM HG: CPT | Performed by: FAMILY MEDICINE

## 2024-02-22 PROCEDURE — 99385 PREV VISIT NEW AGE 18-39: CPT | Performed by: FAMILY MEDICINE

## 2024-02-22 RX ORDER — SEMAGLUTIDE 2.4 MG/.75ML
INJECTION, SOLUTION SUBCUTANEOUS
COMMUNITY
Start: 2024-02-12

## 2024-02-22 NOTE — ASSESSMENT & PLAN NOTE
History of of severe acid reflux.  Has had EGD completed.  Currently on omeprazole 40 mg daily  Discussed with him that he may benefit from decreasing dosage if possible  Can also try Pepcid if able to tolerate  Weight loss discussed  Avoid food triggers.  Follow-up as needed

## 2024-02-22 NOTE — ASSESSMENT & PLAN NOTE
History of hyperlipidemia.  Healthy diet and exercise advised.  Not meeting criteria for statin therapy at this time.    24-Sep-2019 19:51

## 2024-02-22 NOTE — PATIENT INSTRUCTIONS
PATIENT INSTRUCTIONS    Thank you for seeing me today, it was a pleasure taking care of you.  Please check out at the  and schedule a follow up appointment.  Return in about 1 year (around 2/22/2025) for physical .  Please remember that the zuleyka period for all appointments is 5 minutes. This is to help maximize the amount of time that we can spend together at our visits.    The following imaging studies were ordered: None  Please also follow up with the following specialists: Weight  Please fill out the advance directive information (power of  documents) and bring a copy to our clinic.  Please continue to focus on a healthy diet and exercise  Can continue current medication  Monitor hip for now  Tylenol as needed    Best,   Dr. Chinchilla

## 2024-02-22 NOTE — ASSESSMENT & PLAN NOTE
History of fatty liver and elevated liver enzymes.  Suspect nonalcoholic fatty liver disease  With future blood work consider checking viral hepatitis panel as well as iron and ferritin levels again.  Diet and exercise advised  Would benefit from weight loss  Consider repeat ultrasound next year depending on liver enzymes.

## 2024-02-22 NOTE — ASSESSMENT & PLAN NOTE
Exercise and diet advised.  Outside labs from [facility] reviewed.   Flu shot declined  Advanced directive information provided.  Advised COVID vaccine.

## 2024-02-22 NOTE — ASSESSMENT & PLAN NOTE
Intermittent right hip discomfort.  No significant pain at this time  Has good range of movement and no tenderness palpation.  Advise monitoring  Can take Tylenol as needed for discomfort  Follow-up as needed

## 2024-05-14 PROBLEM — Z51.81 ENCOUNTER FOR THERAPEUTIC DRUG MONITORING: Status: ACTIVE | Noted: 2024-02-22

## 2024-05-14 PROBLEM — F98.8 ATTENTION DEFICIT DISORDER (ADD) WITHOUT HYPERACTIVITY: Status: ACTIVE | Noted: 2024-05-14

## 2024-05-14 PROBLEM — E66.9 OBESITY (BMI 30-39.9): Status: ACTIVE | Noted: 2024-05-14

## 2024-06-03 ENCOUNTER — APPOINTMENT (OUTPATIENT)
Dept: URBAN - METROPOLITAN AREA CLINIC 244 | Age: 39
Setting detail: DERMATOLOGY
End: 2024-06-04

## 2024-06-03 DIAGNOSIS — L20.89 OTHER ATOPIC DERMATITIS: ICD-10-CM

## 2024-06-03 DIAGNOSIS — Q826 OTHER SPECIFIED ANOMALIES OF SKIN: ICD-10-CM

## 2024-06-03 DIAGNOSIS — Q828 OTHER SPECIFIED ANOMALIES OF SKIN: ICD-10-CM

## 2024-06-03 DIAGNOSIS — B35.6 TINEA CRURIS: ICD-10-CM

## 2024-06-03 DIAGNOSIS — Q819 OTHER SPECIFIED ANOMALIES OF SKIN: ICD-10-CM

## 2024-06-03 PROBLEM — L85.8 OTHER SPECIFIED EPIDERMAL THICKENING: Status: ACTIVE | Noted: 2024-06-03

## 2024-06-03 PROBLEM — L08.9 LOCAL INFECTION OF THE SKIN AND SUBCUTANEOUS TISSUE, UNSPECIFIED: Status: ACTIVE | Noted: 2024-06-03

## 2024-06-03 PROCEDURE — OTHER COUNSELING: OTHER

## 2024-06-03 PROCEDURE — OTHER PRESCRIPTION MEDICATION MANAGEMENT: OTHER

## 2024-06-03 PROCEDURE — OTHER OTC TREATMENT REGIMEN: OTHER

## 2024-06-03 PROCEDURE — OTHER PRESCRIPTION: OTHER

## 2024-06-03 PROCEDURE — 99214 OFFICE O/P EST MOD 30 MIN: CPT

## 2024-06-03 RX ORDER — HYDROCORTISONE 25 MG/G
CREAM TOPICAL
Qty: 453.6 | Refills: 0 | Status: ERX | COMMUNITY
Start: 2024-06-03

## 2024-06-03 ASSESSMENT — LOCATION SIMPLE DESCRIPTION DERM
LOCATION SIMPLE: LEFT FOREARM
LOCATION SIMPLE: LEFT UPPER ARM
LOCATION SIMPLE: RIGHT UPPER ARM
LOCATION SIMPLE: RIGHT FOREARM
LOCATION SIMPLE: GENITALIA

## 2024-06-03 ASSESSMENT — LOCATION DETAILED DESCRIPTION DERM
LOCATION DETAILED: GENITALIA
LOCATION DETAILED: LEFT PROXIMAL DORSAL FOREARM
LOCATION DETAILED: LEFT PROXIMAL POSTERIOR UPPER ARM
LOCATION DETAILED: RIGHT DISTAL DORSAL FOREARM
LOCATION DETAILED: RIGHT PROXIMAL POSTERIOR UPPER ARM

## 2024-06-03 ASSESSMENT — LOCATION ZONE DERM
LOCATION ZONE: GENITALIA
LOCATION ZONE: ARM

## 2024-06-03 NOTE — PROCEDURE: OTC TREATMENT REGIMEN
Detail Level: Zone
Patient Specific Otc Recommendations (Will Not Stick From Patient To Patient): Lotrimin Ultra, can also apply to feet

## 2024-08-06 ENCOUNTER — TELEPHONE (OUTPATIENT)
Dept: SURGERY | Facility: CLINIC | Age: 39
End: 2024-08-06

## 2024-08-07 NOTE — TELEPHONE ENCOUNTER
PA was initiated.     Outcome: denied.     Weight loss medications are an exclusion under the patient's plan benefits and are not covered.

## 2024-10-12 DIAGNOSIS — E66.9 OBESITY (BMI 30-39.9): ICD-10-CM

## 2024-10-14 RX ORDER — SEMAGLUTIDE 2.4 MG/.75ML
INJECTION, SOLUTION SUBCUTANEOUS
Qty: 3 ML | Refills: 2 | Status: SHIPPED | OUTPATIENT
Start: 2024-10-14

## 2025-01-10 ENCOUNTER — TELEPHONE (OUTPATIENT)
Dept: SURGERY | Facility: CLINIC | Age: 40
End: 2025-01-10

## 2025-01-23 ENCOUNTER — TELEPHONE (OUTPATIENT)
Dept: SURGERY | Facility: CLINIC | Age: 40
End: 2025-01-23

## 2025-01-23 NOTE — TELEPHONE ENCOUNTER
A Prior Authorization is needed for the following medication, WEGOVY 2.4MG/0.75ML INJ (4 PENS).  \" Plan does not cover this medication. Please call plan at [phone] to initiate prior authorization or call/fax pharmacy to change medication. Patient ID # is [id number]. Please advise thank you.

## 2025-03-07 ENCOUNTER — HOSPITAL ENCOUNTER (OUTPATIENT)
Age: 40
Discharge: HOME | End: 2025-03-07
Attending: STUDENT IN AN ORGANIZED HEALTH CARE EDUCATION/TRAINING PROGRAM
Payer: COMMERCIAL

## 2025-03-07 VITALS
TEMPERATURE: 98 F | OXYGEN SATURATION: 99 % | RESPIRATION RATE: 20 BRPM | SYSTOLIC BLOOD PRESSURE: 148 MMHG | HEART RATE: 99 BPM | DIASTOLIC BLOOD PRESSURE: 92 MMHG

## 2025-03-07 DIAGNOSIS — R03.0 ELEVATED BLOOD PRESSURE READING: ICD-10-CM

## 2025-03-07 DIAGNOSIS — R60.0 LOCALIZED EDEMA: Primary | ICD-10-CM

## 2025-03-07 PROCEDURE — 99214 OFFICE O/P EST MOD 30 MIN: CPT

## 2025-03-07 PROCEDURE — 99213 OFFICE O/P EST LOW 20 MIN: CPT

## 2025-03-07 NOTE — ED INITIAL ASSESSMENT (HPI)
Patient arrived ambulatory to room c/o symptoms that started yesterday. Patient c/o pain and swelling to the penis. Denies testicular pain/swelling. Denies penile discharge. No injury. No concern for std's. No urinary complaints. Patient states he was experiencing lower back pain yesterday and headaches yesterday. Resolved today. No abdominal pain. No fevers.

## 2025-03-07 NOTE — ED PROVIDER NOTES
Patient Seen in: [facility]      History     Chief Complaint   Patient presents with    Penile     Stated Complaint: resolved Chills; resolved Headache; penile pain    Subjective:   HPI    40-year-old male with past medical history of HLD, who presents with concern for swelling and pain of the glans of his penis since around 4 AM yesterday morning.  Patient states that he is completely circumcised, has no redundant skin to retract.  Patient states that he woke up at 4 AM yesterday with an erection, is unsure how long it lasted, but reports that he is not currently having an erection, noticed pain of the glans initially but pain is significantly improved since yesterday and does not notice it at this time, but still reportedly red and swollen.  He denies any urinary symptoms.  He denies any injury to the penis or any injury during intercourse.  He denies taking any medications or supplements.  He states yesterday he did notice fatigue, headache, and back pain, but those symptoms have all completely resolved.  Currently, no back pain, no headache, no fatigue.  He has been urinating as usual.  He denies any associated testicular pain.  He denies any penile injury.        Objective:     Past Medical History:    Fatty liver    GERD (gastroesophageal reflux disease)    Hypercholesterolemia    Hyperlipidemia              Past Surgical History:   Procedure Laterality Date    Egd  3/8/2022                     Social History     Socioeconomic History    Marital status:    Tobacco Use    Smoking status: Former     Current packs/day: 0.00     Average packs/day: 0.1 packs/day for 5.0 years (0.3 ttl pk-yrs)     Types: Cigarettes     Start date: 2018     Quit date: 2023     Years since quittin.0    Smokeless tobacco: Never   Vaping Use    Vaping status: Never Used   Substance and Sexual Activity    Alcohol use: Yes     Comment: Occasional - once every 3 months    Drug use: Not Currently     Types:  Cannabis    Sexual activity: Yes     Partners: Female   Social History Narrative    Relationships:  - Marija    Children: Martha (6F) Mack (3M)    Pets: None    School: N/A    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     Origin: [place]. Father - [place]. Mother - [place] descent.     Interests: Enjoys playing basketball, spending time with kids     Spiritual: Believes in God. Not practicing any cassi.      Social Drivers of Health      [facility] [employer] [facility] [facility]              Review of Systems    Positive for stated complaint: Chills; Major Headache;  Other systems are as noted in HPI.  Constitutional and vital signs reviewed.      All other systems reviewed and negative except as noted above.    Physical Exam     ED Triage Vitals [03/07/25 1355]   BP (!) 152/99   Pulse 99   Resp 20   Temp 98 °F (36.7 °C)   Temp src Oral   SpO2 99 %   O2 Device None (Room air)       Current Vitals:   Vital Signs  BP: (!) 148/92  Pulse: 99  Resp: 20  Temp: 98 °F (36.7 °C)  Temp src: Oral    Oxygen Therapy  SpO2: 99 %  O2 Device: None (Room air)        Physical Exam    General: Awake, alert, comfortable on room air, in no distress, tolerating oral secretions, interactive  Pulmonary: Lungs CTAB with no wheezing and no conversational dyspnea  Neuro: CN 2-12 intact, No drift in any extremity, negative Romberg, normal gait, symmetrical facial expressions on gross observation  Musculoskeletal: 5/5 B/L UE and LE strength, no B/L CVA tenderness  HEENT: No periorbital edema or erythema, nonerythematous and nonedematous B/L tonsils, no tonsillar exudates, no peritonsillar edema, uvula midline, tolerating oral secretions, normal speech, no submandibular edema  Neck: No anterior or posterior cervical lymphadenopathy  : Examined at different times due to urology recommendations for repeat exam, and at each time a chaperone was present, Santana the medical assistant, and then Sherrie the nurse, patient has  no swelling or TTP of the B/L testicles or epididymis, the glans is more pinkish and edematous with no lesions, patient is circumcised, no appreciated redundant tissue that can be reduced, subcoronal tissue appears physiologic/normal anatomy that is swollen, nothing reducible, no paraphimosis, no phimosis, patient is detumesced but the shaft is mildly firm, as per recommended by by urology, I am able to completely bend the penis and the patient is not having any pain with palpation or with bending the penis at this time  Psych: Normal mood, normal affect    Pt consented to pictures being placed in chart:                ED Course   No labs or imaging performed  MDM   Patient is not currently erect, but there is some firmness at the shaft, the glans is pinkish and edematous but with no lesions to suggest a balanitis, no sign of paraphimosis or phimosis and the patient is circumcised with no redundant tissue and no retractable/reducible tissue, he is urinating as usual without any urinary symptoms with no suspicion for UTI, no penile discharge to suspect STI, the bumps at the subcoronal tissue he reports are chronic since he was born, I spoke with urologist, Dr. Dejesus, she recommended placing pictures in the chart which patient did consent to and have been copied above, also if able to bend the penis without pain or stiffness would not suspect priapism at this time, as long as no sign of paraphimosis and able to bend the penis without pain, patient can follow-up in 72 hours at their clinic for reassessment and for further recommendations, with very strict ED precautions  -I can completely bend the patient's penis which is pliable without any pain, patient is circumcised which is adamantly confirmed by the patient and no redundant/reducible tissue, tissue appears edematous but physiologic/normal anatomy, and there is no skin that appears retracted to suggest paraphimosis, pt denies any h/o redundant tissue, as she  cannot examine patient herself, my exam was discussed with Dr. Dejesus, as it is Friday, she has scheduled the patient to be reassessed Monday in her clinic, I gave the patient her information, he will follow-up. Per Dr. Dejesus, no role for topical antifungal at this time.  -We discussed very strict ED precautions, with any progression of symptoms, new pain or return of pain, discoloration of the penis, recommended immediate assessment in the emergency department      Patient reports that his headache, back pain, and fatigue have all resolved, no urinary symptoms and no CVA tenderness to suspect pyelonephritis, no other symptoms of cough or nasal congestion to suggest underlying viral infection, given patient's age with no history of severe headaches in the past, discussed CT imaging, but patient is currently asymptomatic, and declines advanced imaging of the brain, he declines viral testing  -If headache returns or with any concerning associated symptoms, did recommend immediate assessment in the ED      -We discussed his elevated blood pressure reading for which I did recommend follow up with his primary care physician    Medical Decision Making  Amount and/or Complexity of Data Reviewed  Discussion of management or test interpretation with external provider(s): Discussed with Dr. Dejesus, Urologist        Disposition and Plan     Clinical Impression:  1. Localized edema    2. Elevated blood pressure reading         Disposition:  Discharge  3/7/2025  2:34 pm    Follow-up:  Gloria Dejesus MD  [address] [place] [id number]  [phone]    On 3/10/2025  1:45 PM appointment          Medications Prescribed:  Discharge Medication List as of 3/7/2025  2:34 PM            None

## 2025-03-07 NOTE — DISCHARGE INSTRUCTIONS
At this time, there is no sign of fungal infection called balanitis.  There is no sign of a prolonged erection called priapism, no sign of fracture of the penis, and no sign of paraphimosis which would be if you had not been circumcised as this tissue can wraparound and constrict the penis.    It is very important that you follow-up with urology in 72 hours as this appointment has been made for you at their clinic.    If you develop any darkened discoloration such as blackness or blueness of the penis or the head of the penis, pain, an erection that does not resolve, or any progression of swelling, I recommend immediate assessment in the emergency department.    Your blood pressure was noted to be elevated today, please follow up with your primary care physician for reassessment and for further recommendations.    If you have return of headache, or numbness or tingling or weakness in any extremity, I recommend immediate assessment in the emergency department.

## (undated) NOTE — LETTER
Date & Time: 4/20/2023, 10:42 AM  Patient: Venus Iglesias  Encounter Provider(s):    David Zee MD       To Whom It May Concern:    Venus Iglesias was seen and treated in our department on 4/20/2023. He should not return to work until 4/22/2023 .     If you have any questions or concerns, please do not hesitate to call.        _____________________________  Physician/[facility] Signature